# Patient Record
Sex: FEMALE | Race: BLACK OR AFRICAN AMERICAN | NOT HISPANIC OR LATINO | Employment: FULL TIME | URBAN - METROPOLITAN AREA
[De-identification: names, ages, dates, MRNs, and addresses within clinical notes are randomized per-mention and may not be internally consistent; named-entity substitution may affect disease eponyms.]

---

## 2018-05-02 ENCOUNTER — OFFICE VISIT (OUTPATIENT)
Dept: FAMILY MEDICINE CLINIC | Facility: CLINIC | Age: 38
End: 2018-05-02

## 2018-05-02 VITALS
HEART RATE: 86 BPM | DIASTOLIC BLOOD PRESSURE: 88 MMHG | BODY MASS INDEX: 39.25 KG/M2 | WEIGHT: 265 LBS | OXYGEN SATURATION: 100 % | SYSTOLIC BLOOD PRESSURE: 140 MMHG | HEIGHT: 69 IN

## 2018-05-02 DIAGNOSIS — IMO0001 OBESITY, CLASS II, BMI 35-39.9, WITH COMORBIDITY: Primary | ICD-10-CM

## 2018-05-02 PROCEDURE — 99213 OFFICE O/P EST LOW 20 MIN: CPT | Performed by: PHYSICIAN ASSISTANT

## 2018-05-02 NOTE — PROGRESS NOTES
Subjective   Malina Wayne is a 38 y.o. female  Weight Gain (Would like to start on weight loss program)      History of Present Illness  Patient's 38-year-old black female comes in complaining of weight gain patient's currently on prednisone daily due to rhabdomyolysis.  Patient states that because of that she has gained a lot of weight she's been trying to diet and exercise she's tried phentermine in the past like to do it again  Exercising daily watching carb intake.      The following portions of the patient's history were reviewed and updated as appropriate: allergies, current medications, past social history and problem list    Review of Systems   Constitutional: Negative for appetite change, diaphoresis, fatigue and unexpected weight change.   Eyes: Negative for visual disturbance.   Respiratory: Negative for cough, chest tightness and shortness of breath.    Cardiovascular: Negative for chest pain, palpitations and leg swelling.   Gastrointestinal: Negative for diarrhea, nausea and vomiting.   Endocrine: Negative for polydipsia, polyphagia and polyuria.   Skin: Negative for color change and rash.   Neurological: Negative for dizziness, syncope, weakness, light-headedness, numbness and headaches.       Objective     Vitals:    05/02/18 1420   BP: 140/88   Pulse: 86   SpO2: 100%       Physical Exam   Constitutional: She appears well-developed and well-nourished.   Neck: Neck supple. No JVD present. No thyromegaly present.   Cardiovascular: Normal rate, regular rhythm, normal heart sounds, intact distal pulses and normal pulses.    No murmur heard.  Pulmonary/Chest: Effort normal and breath sounds normal. No respiratory distress.   Abdominal: Soft. Bowel sounds are normal. There is no hepatosplenomegaly. There is no tenderness.   Musculoskeletal: She exhibits no edema.   Lymphadenopathy:     She has no cervical adenopathy.   Neurological: No sensory deficit.   Skin: Skin is warm and dry. She is not diaphoretic.    Nursing note and vitals reviewed.      Assessment/Plan     Diagnoses and all orders for this visit:    Obesity, Class II, BMI 35-39.9, with comorbidity    Other orders  -     Mycophenolate Mofetil (CELLCEPT PO); Take  by mouth.     phentermine 37.5 mg 1 by mouth everyday dispense 30×3 refills    Follow-up as needed

## 2018-06-08 ENCOUNTER — OFFICE VISIT (OUTPATIENT)
Dept: FAMILY MEDICINE CLINIC | Facility: CLINIC | Age: 38
End: 2018-06-08

## 2018-06-08 VITALS
HEART RATE: 95 BPM | RESPIRATION RATE: 16 BRPM | SYSTOLIC BLOOD PRESSURE: 128 MMHG | OXYGEN SATURATION: 97 % | HEIGHT: 69 IN | BODY MASS INDEX: 38.66 KG/M2 | DIASTOLIC BLOOD PRESSURE: 86 MMHG | WEIGHT: 261 LBS

## 2018-06-08 DIAGNOSIS — E66.9 OBESITY (BMI 35.0-39.9 WITHOUT COMORBIDITY): Primary | ICD-10-CM

## 2018-06-08 PROCEDURE — 99213 OFFICE O/P EST LOW 20 MIN: CPT | Performed by: PHYSICIAN ASSISTANT

## 2018-06-08 RX ORDER — PHENTERMINE HYDROCHLORIDE 37.5 MG/1
37.5 TABLET ORAL
COMMUNITY
End: 2019-05-20 | Stop reason: SDUPTHER

## 2018-06-08 NOTE — PROGRESS NOTES
Subjective   Malina Wayne is a 38 y.o. female  Obesity (Started phentermine one month ago-hast lost 4#)      History of Present Illness  Patient is a pleasant 38-year-old white female comes in complaining of severe weight gain obesity, she was started on phentermine last visit she also pounds she's trying to walk more be more healthy states she does feel better she has weight loss goal 30 pounds by the end of the summer.  Denies any shortness breath chest pain have been walking as much she can along with low-fat low calorie diet      The following portions of the patient's history were reviewed and updated as appropriate: allergies, current medications, past social history and problem list    Review of Systems   Constitutional: Negative for appetite change, diaphoresis, fatigue and unexpected weight change.   Eyes: Negative for visual disturbance.   Respiratory: Negative for cough, chest tightness and shortness of breath.    Cardiovascular: Negative for chest pain, palpitations and leg swelling.   Gastrointestinal: Negative for diarrhea, nausea and vomiting.   Endocrine: Negative for polydipsia, polyphagia and polyuria.   Skin: Negative for color change and rash.   Neurological: Negative for dizziness, syncope, weakness, light-headedness, numbness and headaches.       Objective     Vitals:    06/08/18 0954   BP: 128/86   Pulse: 95   Resp: 16   SpO2: 97%       Physical Exam   Constitutional: She appears well-developed and well-nourished.   Neck: Neck supple. No JVD present. No thyromegaly present.   Cardiovascular: Normal rate, regular rhythm, normal heart sounds, intact distal pulses and normal pulses.    No murmur heard.  Pulmonary/Chest: Effort normal and breath sounds normal. No respiratory distress.   Abdominal: Soft. Bowel sounds are normal. There is no hepatosplenomegaly. There is no tenderness.   Musculoskeletal: She exhibits no edema.   Lymphadenopathy:     She has no cervical adenopathy.   Neurological: No  sensory deficit.   Skin: Skin is warm and dry. She is not diaphoretic.   Nursing note and vitals reviewed.      Assessment/Plan     Diagnoses and all orders for this visit:    Obesity (BMI 35.0-39.9 without comorbidity)  -     phentermine (ADIPEX-P) 37.5 MG tablet; Take 37.5 mg by mouth Every Morning Before Breakfast.    Discussed with patient diet and exercise time spent with patient 11:30 11:45 AM, 9 minutes that time he's discussed treatment plan long-term treatment plan were discussed low-calorie low-carb diet along with exercise 3-5 times per week phentermine was refilled 37.5 mg everyday ×3 refills

## 2018-08-23 ENCOUNTER — OFFICE VISIT (OUTPATIENT)
Dept: FAMILY MEDICINE CLINIC | Facility: CLINIC | Age: 38
End: 2018-08-23

## 2018-08-23 VITALS
RESPIRATION RATE: 16 BRPM | HEIGHT: 69 IN | DIASTOLIC BLOOD PRESSURE: 82 MMHG | HEART RATE: 97 BPM | BODY MASS INDEX: 39.58 KG/M2 | WEIGHT: 267.2 LBS | SYSTOLIC BLOOD PRESSURE: 118 MMHG | OXYGEN SATURATION: 98 %

## 2018-08-23 DIAGNOSIS — R63.5 WEIGHT GAIN: Primary | ICD-10-CM

## 2018-08-23 PROCEDURE — 99213 OFFICE O/P EST LOW 20 MIN: CPT | Performed by: PHYSICIAN ASSISTANT

## 2018-08-23 NOTE — PROGRESS NOTES
Subjective   Malina Wayne is a 38 y.o. female  Obesity      History of Present Illness  38-year-old black female comes in plan of weight gain she states she's been having follow low-fat diet with some exercise which is helping but she's gained weight since her last visit she's been on phentermine months like refill she has been trying to follow low carb low calorie diet.  The following portions of the patient's history were reviewed and updated as appropriate: allergies, current medications, past social history and problem list    Review of Systems   Constitutional: Negative for appetite change, diaphoresis, fatigue and unexpected weight change.   Eyes: Negative for visual disturbance.   Respiratory: Negative for cough, chest tightness and shortness of breath.    Cardiovascular: Negative for chest pain, palpitations and leg swelling.   Gastrointestinal: Negative for diarrhea, nausea and vomiting.   Endocrine: Negative for polydipsia, polyphagia and polyuria.   Skin: Negative for color change and rash.   Neurological: Negative for dizziness, syncope, weakness, light-headedness, numbness and headaches.       Objective     Vitals:    08/23/18 1001   BP: 118/82   Pulse: 97   Resp: 16   SpO2: 98%       Physical Exam   Constitutional: She appears well-developed and well-nourished.   Neck: Neck supple. No JVD present. No thyromegaly present.   Cardiovascular: Normal rate, regular rhythm, normal heart sounds, intact distal pulses and normal pulses.    No murmur heard.  Pulmonary/Chest: Effort normal and breath sounds normal. No respiratory distress.   Abdominal: Soft. Bowel sounds are normal. There is no hepatosplenomegaly. There is no tenderness.   Musculoskeletal: She exhibits no edema.   Lymphadenopathy:     She has no cervical adenopathy.   Neurological: No sensory deficit.   Skin: Skin is warm and dry. She is not diaphoretic.   Nursing note and vitals reviewed.      Assessment/Plan     Diagnoses and all orders for  this visit:    Weight gain    #1 phentermine 37.5 mg 1 pill everyday dispense 30×3 refills    Follow-up as needed    Times with patient 4:00 to 4:15 AM, 9 minutes that time he's discussed treating plan long-term treatment plan discussed ways to improve overall health talked about low calorie low-carb diet exercise 3-5 times per week for 30 minutes no SI/HI

## 2019-05-20 DIAGNOSIS — E66.9 OBESITY (BMI 35.0-39.9 WITHOUT COMORBIDITY): ICD-10-CM

## 2019-05-20 RX ORDER — PHENTERMINE HYDROCHLORIDE 37.5 MG/1
37.5 TABLET ORAL
Qty: 30 TABLET | Refills: 3 | OUTPATIENT
Start: 2019-05-20 | End: 2020-07-28

## 2019-05-20 NOTE — TELEPHONE ENCOUNTER
----- Message from Rita Black sent at 2019  9:43 AM EDT -----  Contact: [facility]/HUAN JAQUEZ, YAMEL, [phone]  PT. SEE'S SAM.  PT. HAS AN  SCRIPT BROUGHT INTO [facility].  PT. IS WONDERING IF ANOTHER SCRIPT CAN BE WRITTEN FOR PHENTERMINE, 37.5 MG., TAKEN 1/DAY.  (WRITTEN IN AUGUST).  3 REFILLS WERE ON IT.    YAMEL CAN BE REACHED @ [facility].

## 2019-09-25 ENCOUNTER — OFFICE VISIT (OUTPATIENT)
Dept: FAMILY MEDICINE CLINIC | Facility: CLINIC | Age: 39
End: 2019-09-25

## 2019-09-25 VITALS
DIASTOLIC BLOOD PRESSURE: 74 MMHG | OXYGEN SATURATION: 99 % | BODY MASS INDEX: 33.44 KG/M2 | RESPIRATION RATE: 14 BRPM | WEIGHT: 225.8 LBS | HEART RATE: 87 BPM | SYSTOLIC BLOOD PRESSURE: 124 MMHG | HEIGHT: 69 IN

## 2019-09-25 DIAGNOSIS — M54.2 NECK PAIN: ICD-10-CM

## 2019-09-25 DIAGNOSIS — N62 LARGE BREASTS: ICD-10-CM

## 2019-09-25 DIAGNOSIS — R21 RASH: Primary | ICD-10-CM

## 2019-09-25 PROCEDURE — 99214 OFFICE O/P EST MOD 30 MIN: CPT | Performed by: PHYSICIAN ASSISTANT

## 2019-09-25 NOTE — PROGRESS NOTES
Subjective   Malina Wayne is a 39 y.o. female  Neck Pain and Rash (Dry areas on neck/face, itchy)  44 DG    History of Present Illness    Patient is a pleasant 39-year-old white female who comes in complaint of rash around neck for the last 2 to 3 days itchy irritated rash no over-the-counter products used on neck she has not put any hydrocortisone cream or any kind of medication on rash,    Patient complains of chronic neck pain and shoulder area back she was been diagnosed with polymyositis in the past she has chronic weakness in her upper extremity due to her large pendulous breast patient worse 44 Dg, patient has pain in both right left shoulders, weakness sometimes in upper extremities.  The following portions of the patient's history were reviewed and updated as appropriate: allergies, current medications, past social history and problem list    Review of Systems   Constitutional: Negative for fatigue and unexpected weight change.   Respiratory: Negative for cough, chest tightness and shortness of breath.    Cardiovascular: Negative for chest pain, palpitations and leg swelling.   Gastrointestinal: Negative for nausea.   Musculoskeletal: Positive for back pain and neck pain.   Skin: Positive for rash. Negative for color change.   Neurological: Negative for dizziness, syncope, weakness and headaches.       Objective     Vitals:    09/25/19 1611   BP: 124/74   Pulse: 87   Resp: 14   SpO2: 99%       Physical Exam   Constitutional: She appears well-developed and well-nourished.   Neck: No JVD present.   Cardiovascular: Normal rate, regular rhythm, normal heart sounds, intact distal pulses and normal pulses.   No murmur heard.  Pulmonary/Chest: Effort normal and breath sounds normal. No respiratory distress.       Abdominal: Soft. Bowel sounds are normal. There is no hepatosplenomegaly. There is no tenderness.   Musculoskeletal: She exhibits no edema.        Cervical back: She exhibits decreased range of motion,  tenderness and bony tenderness.   Skin: Skin is warm and dry.        Nursing note and vitals reviewed.      Assessment/Plan     Diagnoses and all orders for this visit:    Rash    Neck pain    Large breasts  -     Ambulatory Referral to Plastic Surgery    #1 eucuisa cream, apply twice daily dispense 30 g samples given    2.  Range of motion exercises given for neck, OTC Tylenol for pain    3.  Refer to plastic surgery for evaluation of breast reduction

## 2019-10-25 ENCOUNTER — TELEPHONE (OUTPATIENT)
Dept: FAMILY MEDICINE CLINIC | Facility: CLINIC | Age: 39
End: 2019-10-25

## 2019-10-25 NOTE — TELEPHONE ENCOUNTER
Pt would like a call to discuss her:  AMB REFERRAL TO PLASTIC SURGERY    Crystal can be reached at [phone].

## 2019-10-28 NOTE — TELEPHONE ENCOUNTER
Patient hasn't heard about appointment yet and I told her that you would be able to help her more than me. Thank you!

## 2020-01-03 ENCOUNTER — TELEPHONE (OUTPATIENT)
Dept: FAMILY MEDICINE CLINIC | Facility: CLINIC | Age: 40
End: 2020-01-03

## 2020-01-03 NOTE — TELEPHONE ENCOUNTER
Divine from [facility] is calling to inform Bernard Marie that she is giving the patient Midol, Probiotic Caps, and Sleep 8 over the counter.    For any questions or concerns please call Divine at .

## 2020-02-10 ENCOUNTER — TELEPHONE (OUTPATIENT)
Dept: FAMILY MEDICINE CLINIC | Facility: CLINIC | Age: 40
End: 2020-02-10

## 2020-02-10 NOTE — TELEPHONE ENCOUNTER
Patient requesting a call back regarding the status of the referral for Breast Reduction Surgery.   Patient’s call back number is: [phone]

## 2020-07-28 ENCOUNTER — OFFICE VISIT (OUTPATIENT)
Dept: INTERNAL MEDICINE | Facility: CLINIC | Age: 40
End: 2020-07-28

## 2020-07-28 VITALS
SYSTOLIC BLOOD PRESSURE: 122 MMHG | OXYGEN SATURATION: 98 % | RESPIRATION RATE: 16 BRPM | WEIGHT: 164 LBS | BODY MASS INDEX: 24.29 KG/M2 | HEIGHT: 69 IN | TEMPERATURE: 98.1 F | DIASTOLIC BLOOD PRESSURE: 78 MMHG | HEART RATE: 76 BPM

## 2020-07-28 DIAGNOSIS — T50.905A DRUG INDUCED RASH WITH EOSINOPHILIA AND SYSTEMIC SYMPTOMS: Primary | ICD-10-CM

## 2020-07-28 DIAGNOSIS — M33.20 POLYMYOSITIS (HCC): ICD-10-CM

## 2020-07-28 DIAGNOSIS — D72.12 DRUG INDUCED RASH WITH EOSINOPHILIA AND SYSTEMIC SYMPTOMS: Primary | ICD-10-CM

## 2020-07-28 PROCEDURE — 99213 OFFICE O/P EST LOW 20 MIN: CPT | Performed by: NURSE PRACTITIONER

## 2020-07-28 RX ORDER — TRIAMCINOLONE ACETONIDE 5 MG/G
OINTMENT TOPICAL 2 TIMES DAILY
Qty: 15 G | Refills: 1 | Status: SHIPPED | OUTPATIENT
Start: 2020-07-28

## 2020-07-28 RX ORDER — HYDROXYZINE HYDROCHLORIDE 25 MG/1
25 TABLET, FILM COATED ORAL EVERY 6 HOURS PRN
Qty: 30 TABLET | Refills: 5 | Status: SHIPPED | OUTPATIENT
Start: 2020-07-28 | End: 2021-07-27 | Stop reason: SDUPTHER

## 2020-07-28 RX ORDER — TACROLIMUS 1 MG/1
3 CAPSULE ORAL
COMMUNITY
End: 2020-11-10

## 2020-07-28 RX ORDER — METHYLPREDNISOLONE 4 MG/1
TABLET ORAL
Qty: 21 TABLET | Refills: 0 | Status: SHIPPED | OUTPATIENT
Start: 2020-07-28 | End: 2020-11-10

## 2020-07-28 NOTE — PATIENT INSTRUCTIONS
Sertraline tablets  What is this medicine?  SERTRALINE (SER tra zhane) is used to treat depression. It may also be used to treat obsessive compulsive disorder, panic disorder, post-trauma stress, premenstrual dysphoric disorder (PMDD) or social anxiety.  This medicine may be used for other purposes; ask your health care provider or pharmacist if you have questions.  COMMON BRAND NAME(S): Zoloft  What should I tell my health care provider before I take this medicine?  They need to know if you have any of these conditions:  · bleeding disorders  · bipolar disorder or a family history of bipolar disorder  · glaucoma  · heart disease  · high blood pressure  · history of irregular heartbeat  · history of low levels of calcium, magnesium, or potassium in the blood  · if you often drink alcohol  · liver disease  · receiving electroconvulsive therapy  · seizures  · suicidal thoughts, plans, or attempt; a previous suicide attempt by you or a family member  · take medicines that treat or prevent blood clots  · thyroid disease  · an unusual or allergic reaction to sertraline, other medicines, foods, dyes, or preservatives  · pregnant or trying to get pregnant  · breast-feeding  How should I use this medicine?  Take this medicine by mouth with a glass of water. Follow the directions on the prescription label. You can take it with or without food. Take your medicine at regular intervals. Do not take your medicine more often than directed. Do not stop taking this medicine suddenly except upon the advice of your doctor. Stopping this medicine too quickly may cause serious side effects or your condition may worsen.  A special MedGuide will be given to you by the pharmacist with each prescription and refill. Be sure to read this information carefully each time.  Talk to your pediatrician regarding the use of this medicine in children. While this drug may be prescribed for children as young as 7 years for selected conditions,  precautions do apply.  Overdosage: If you think you have taken too much of this medicine contact a poison control center or emergency room at once.  NOTE: This medicine is only for you. Do not share this medicine with others.  What if I miss a dose?  If you miss a dose, take it as soon as you can. If it is almost time for your next dose, take only that dose. Do not take double or extra doses.  What may interact with this medicine?  Do not take this medicine with any of the following medications:  · cisapride  · dronedarone  · linezolid  · MAOIs like Carbex, Eldepryl, Marplan, Nardil, and Parnate  · methylene blue (injected into a vein)  · pimozide  · thioridazine  This medicine may also interact with the following medications:  · alcohol  · amphetamines  · aspirin and aspirin-like medicines  · certain medicines for depression, anxiety, or psychotic disturbances  · certain medicines for fungal infections like ketoconazole, fluconazole, posaconazole, and itraconazole  · certain medicines for irregular heart beat like flecainide, quinidine, propafenone  · certain medicines for migraine headaches like almotriptan, eletriptan, frovatriptan, naratriptan, rizatriptan, sumatriptan, zolmitriptan  · certain medicines for sleep  · certain medicines for seizures like carbamazepine, valproic acid, phenytoin  · certain medicines that treat or prevent blood clots like warfarin, enoxaparin, dalteparin  · cimetidine  · digoxin  · diuretics  · fentanyl  · isoniazid  · lithium  · NSAIDs, medicines for pain and inflammation, like ibuprofen or naproxen  · other medicines that prolong the QT interval (cause an abnormal heart rhythm) like dofetilide  · rasagiline  · safinamide  · supplements like [place]'s wort, kava kava, valerian  · tolbutamide  · tramadol  · tryptophan  This list may not describe all possible interactions. Give your health care provider a list of all the medicines, herbs, non-prescription drugs, or dietary supplements  you use. Also tell them if you smoke, drink alcohol, or use illegal drugs. Some items may interact with your medicine.  What should I watch for while using this medicine?  Tell your doctor if your symptoms do not get better or if they get worse. Visit your doctor or health care professional for regular checks on your progress. Because it may take several weeks to see the full effects of this medicine, it is important to continue your treatment as prescribed by your doctor.  Patients and their families should watch out for new or worsening thoughts of suicide or depression. Also watch out for sudden changes in feelings such as feeling anxious, agitated, panicky, irritable, hostile, aggressive, impulsive, severely restless, overly excited and hyperactive, or not being able to sleep. If this happens, especially at the beginning of treatment or after a change in dose, call your health care professional.  You may get drowsy or dizzy. Do not drive, use machinery, or do anything that needs mental alertness until you know how this medicine affects you. Do not stand or sit up quickly, especially if you are an older patient. This reduces the risk of dizzy or fainting spells. Alcohol may interfere with the effect of this medicine. Avoid alcoholic drinks.  Your mouth may get dry. Chewing sugarless gum or sucking hard candy, and drinking plenty of water may help. Contact your doctor if the problem does not go away or is severe.  What side effects may I notice from receiving this medicine?  Side effects that you should report to your doctor or health care professional as soon as possible:  · allergic reactions like skin rash, itching or hives, swelling of the face, lips, or tongue  · anxious  · black, tarry stools  · changes in vision  · confusion  · elevated mood, decreased need for sleep, racing thoughts, impulsive behavior  · eye pain  · fast, irregular heartbeat  · feeling faint or lightheaded, falls  · feeling agitated,  angry, or irritable  · hallucination, loss of contact with reality  · loss of balance or coordination  · loss of memory  · painful or prolonged erections  · restlessness, pacing, inability to keep still  · seizures  · stiff muscles  · suicidal thoughts or other mood changes  · trouble sleeping  · unusual bleeding or bruising  · unusually weak or tired  · vomiting  Side effects that usually do not require medical attention (report to your doctor or health care professional if they continue or are bothersome):  · change in appetite or weight  · change in sex drive or performance  · diarrhea  · increased sweating  · indigestion, nausea  · tremors  This list may not describe all possible side effects. Call your doctor for medical advice about side effects. You may report side effects to FDA at 2-738-YKM-6042.  Where should I keep my medicine?  Keep out of the reach of children.  Store at room temperature between 15 and 30 degrees C (59 and 86 degrees F). Throw away any unused medicine after the expiration date.  NOTE: This sheet is a summary. It may not cover all possible information. If you have questions about this medicine, talk to your doctor, pharmacist, or health care provider.  © 2020 [employer] (2019-12-10 10:09:27)

## 2020-07-28 NOTE — PROGRESS NOTES
Malina Wayne is a 40 y.o. female who presents today to establish care and discuss possible medication reaction.    Chief Complaint   Patient presents with   • Establish Care     Pt states itchiness and dry   • Medication Problem       Allergic Reaction   This is a recurrent problem. The current episode started more than 1 week ago. The problem occurs intermittently. The problem has been waxing and waning since onset. The problem is mild. The patient was exposed to a prescription drug and unknown. The time of exposure is not relevant (no exposure). Associated symptoms include itching and a rash. Pertinent negatives include no abdominal pain, chest pain or coughing. There is no swelling present. Past treatments include rest and diphenhydramine. The treatment provided mild relief. There is no history of medication allergies or seasonal allergies.       The following portions of the patient's history were reviewed and updated as appropriate: allergies, current medications, past family history, past medical history, past social history, past surgical history and problem list.     Past Medical History:   Diagnosis Date   • Acute pharyngitis    • Conjunctivitis    • Dysthymic disorder    • Hypertension    • Malaise and fatigue    • Nonspecific abnormal serum enzyme levels    • Serous otitis media    • Weight gain        History reviewed. No pertinent surgical history.    Family History   Problem Relation Age of Onset   • Hypertension Mother    • Hyperlipidemia Mother    • Heart failure Father    • No Known Problems Sister    • Hypertension Brother    • Stroke Maternal Grandmother        Social History     Socioeconomic History   • Marital status:      Spouse name: Not on file   • Number of children: Not on file   • Years of education: Not on file   • Highest education level: Not on file   Tobacco Use   • Smoking status: Never Smoker   • Smokeless tobacco: Never Used   Substance and Sexual Activity   • Alcohol use:  Yes     Comment: Occ   • Drug use: No   • Sexual activity: Defer       No Known Allergies      Current Outpatient Medications:   •  Calcium Carbonate (CALCIUM 600 PO), Take  by mouth., Disp: , Rfl:   •  chlorthalidone (HYGROTEN) 25 MG tablet, , Disp: , Rfl:   •  Cholecalciferol (VITAMIN D PO), Take  by mouth., Disp: , Rfl:   •  folic acid (FOLVITE) 1 MG tablet, , Disp: , Rfl:   •  Mycophenolate Mofetil (CELLCEPT PO), Take 1,000 mg by mouth., Disp: , Rfl:   •  potassium chloride (KLOR-CON) 20 MEQ CR tablet, Take 20 mEq by mouth 2 (two) times a day., Disp: , Rfl:   •  riTUXimab (RITUXAN) 10 MG/ML solution injection, Infuse 10 mg into a venous catheter Every 6 (Six) Months., Disp: , Rfl:   •  tacrolimus (PROGRAF) 1 MG capsule, 3 mg., Disp: , Rfl:   •  hydrOXYzine (ATARAX) 25 MG tablet, Take 1 tablet by mouth Every 6 (Six) Hours As Needed for Itching or Anxiety., Disp: 30 tablet, Rfl: 5  •  methylPREDNISolone (Medrol) 4 MG tablet, Take as directed on package instructions., Disp: 21 tablet, Rfl: 0  •  triamcinolone (KENALOG) 0.5 % ointment, Apply  topically to the appropriate area as directed 2 (Two) Times a Day., Disp: 15 g, Rfl: 1    Health Maintenance   Topic Date Due   • ANNUAL PHYSICAL  02/20/1982   • TDAP/TD VACCINES (1 - Tdap) 02/20/1990   • HEPATITIS C SCREENING  06/09/2016   • INFLUENZA VACCINE  08/01/2020   • PAP SMEAR  02/21/2023        ROS    Review of Systems   Constitutional: Negative for chills and fever.   Eyes: Negative for blurred vision and visual disturbance.   Respiratory: Negative for cough.    Cardiovascular: Negative for chest pain, palpitations and leg swelling.   Gastrointestinal: Negative for abdominal pain and nausea.   Skin: Positive for color change, itching and rash.   Allergic/Immunologic: Negative for immunocompromised state.   Neurological: Negative for headache.   Hematological: Negative for adenopathy.       Visit Vitals  /78   Pulse 76   Temp 98.1 °F (36.7 °C)   Resp 16   Ht  "175.3 cm (69\")   Wt 74.4 kg (164 lb)   SpO2 98%   Breastfeeding No   BMI 24.22 kg/m²       Physical Exam     Physical Exam   Constitutional: She is oriented to person, place, and time. She appears well-developed and well-nourished. No distress.   HENT:   Head: Normocephalic and atraumatic.   Eyes: Pupils are equal, round, and reactive to light. Conjunctivae and EOM are normal.   Neck: Normal range of motion. Neck supple.   Cardiovascular: Normal rate, regular rhythm and normal heart sounds. Exam reveals no gallop and no friction rub.   No murmur heard.  Pulmonary/Chest: Effort normal and breath sounds normal.   Musculoskeletal: Normal range of motion.   Lymphadenopathy:     She has no cervical adenopathy.   Neurological: She is alert and oriented to person, place, and time.   Skin: Skin is warm. Capillary refill takes less than 2 seconds. Rash noted. She is not diaphoretic.   Hypopigmentation in groin area and some spots on arms.   Psychiatric: She has a normal mood and affect. Her behavior is normal. Judgment and thought content normal.   Nursing note and vitals reviewed.      Assessment/Plan   Problems Addressed this Visit        Musculoskeletal and Integument    Polymyositis (CMS/HCC)     Continue current medication regimen. Patient requested second opinion with another rheumatologist.          Relevant Orders    Ambulatory Referral to Rheumatology (Completed)      Other Visit Diagnoses     Drug induced rash with eosinophilia and systemic symptoms    -  Primary    Take medications as prescribed. Referrals placed.    Relevant Medications    methylPREDNISolone (Medrol) 4 MG tablet    hydrOXYzine (ATARAX) 25 MG tablet    triamcinolone (KENALOG) 0.5 % ointment    Other Relevant Orders    Ambulatory Referral to Dermatology (Completed)              Plan of care reviewed with patient at the conclusion of today's visit. Education was provided regarding diagnosis, management and any prescribed or recommended OTC " medications.  Patient verbalizes understanding of and agreement with management plan.    Return in about 3 months (around 10/28/2020) for Recheck.    ISMAEL Casillas

## 2020-07-29 NOTE — ASSESSMENT & PLAN NOTE
Continue current medication regimen. Patient requested second opinion with another rheumatologist.

## 2020-08-19 ENCOUNTER — TELEPHONE (OUTPATIENT)
Dept: INTERNAL MEDICINE | Facility: CLINIC | Age: 40
End: 2020-08-19

## 2020-08-19 NOTE — TELEPHONE ENCOUNTER
PT CALLED TO GET A REFERRAL FOR A BREAST REDUCTION. PT STATED THAT A REFERRAL WAS ALREADY SENT BUT SHE HAS A NEW PCP AND МАРИЯ OCAMPO NEEDS RESEND THE REFERRAL.    PT CONTACT [phone]     PLEASE ADVISE

## 2020-08-21 DIAGNOSIS — N62 LARGE BREASTS: Primary | ICD-10-CM

## 2020-11-10 ENCOUNTER — OFFICE VISIT (OUTPATIENT)
Dept: INTERNAL MEDICINE | Facility: CLINIC | Age: 40
End: 2020-11-10

## 2020-11-10 VITALS
OXYGEN SATURATION: 99 % | BODY MASS INDEX: 25.18 KG/M2 | WEIGHT: 170 LBS | SYSTOLIC BLOOD PRESSURE: 128 MMHG | HEART RATE: 82 BPM | TEMPERATURE: 97.3 F | HEIGHT: 69 IN | DIASTOLIC BLOOD PRESSURE: 90 MMHG

## 2020-11-10 DIAGNOSIS — F41.1 GENERALIZED ANXIETY DISORDER: Primary | ICD-10-CM

## 2020-11-10 DIAGNOSIS — L30.9 ECZEMA, UNSPECIFIED TYPE: ICD-10-CM

## 2020-11-10 PROCEDURE — 99214 OFFICE O/P EST MOD 30 MIN: CPT | Performed by: NURSE PRACTITIONER

## 2020-11-10 RX ORDER — DUPILUMAB 300 MG/2ML
INJECTION, SOLUTION SUBCUTANEOUS
COMMUNITY
Start: 2020-11-05

## 2020-11-10 RX ORDER — BUSPIRONE HYDROCHLORIDE 7.5 MG/1
7.5 TABLET ORAL 3 TIMES DAILY
Qty: 90 TABLET | Refills: 1 | Status: SHIPPED | OUTPATIENT
Start: 2020-11-10

## 2020-11-10 NOTE — PROGRESS NOTES
Chief Complaint   Patient presents with   • Rash     Follow up   • Anxiety       History of Present Illness    Malina Wayne is a 40 y.o. female who presents today for rash and anxiety.    Derm- has been following with dermatology per previous referral for eczema, was prescribed Dupixent and triamcinolone cream which has worked well.  Rash has resolved and patient denies open skin wounds or lesions at this time. Areas generally affected are across chest and bra line, lower back.has also tried to eliminate sugar and carbohydrates from diet to help with skin conditions.    Anxiety- anxiety worse at work; working third shift as a .  Reports persistent insomnia with poor sleep quality secondary to broken sleeping pattern throughout the day while trying to care for young children.  Sleeps about 3 hours at a time. Also having multiple external stressors with marital concerns. Taking hydroxyzine as needed but no difference noticed, tried taking every 5-6 hours, does not notice a relief with scheduled dosing either.  She reports no adverse side effects of hydroxyzine, no next-day drowsiness or fatigue noted. Has had one panic episode while at work in the last few months. Has supportive family/friends. Seeing a therapist biweekly to monthly.  Endorses frequent racing thoughts, insomnia, crying.  Feels like happiness is limited due to history of adverse events when things have gone well.    DARLENE-7: 16     Review of Systems  Review of Systems   Constitutional: Negative for appetite change, chills, diaphoresis, fatigue and fever.   Respiratory: Negative for cough and shortness of breath.    Cardiovascular: Negative for chest pain and palpitations.   Gastrointestinal: Negative for abdominal pain, diarrhea, nausea and vomiting.   Skin: Negative for color change, dry skin and rash.   Neurological: Negative for dizziness, weakness, light-headedness and headache.   Psychiatric/Behavioral: Positive for sleep disturbance  and stress. Negative for behavioral problems, decreased concentration, self-injury, suicidal ideas and depressed mood. The patient is nervous/anxious.        [facility]    The following portions of the patient's history were reviewed and updated as appropriate: allergies, current medications, past family history, past medical history, past social history, past surgical history and problem list.     Social History     Tobacco Use   • Smoking status: Never Smoker   • Smokeless tobacco: Never Used   Substance Use Topics   • Alcohol use: Yes     Comment: Occ       Past Medical History:   Diagnosis Date   • Acute pharyngitis    • Conjunctivitis    • Dysthymic disorder    • Hypertension    • Malaise and fatigue    • Nonspecific abnormal serum enzyme levels    • Serous otitis media    • Weight gain        History reviewed. No pertinent surgical history.    Family History   Problem Relation Age of Onset   • Hypertension Mother    • Hyperlipidemia Mother    • Heart failure Father    • No Known Problems Sister    • Hypertension Brother    • Stroke Maternal Grandmother        No Known Allergies      Current Outpatient Medications:   •  Calcium Carbonate (CALCIUM 600 PO), Take  by mouth., Disp: , Rfl:   •  Cholecalciferol (VITAMIN D PO), Take  by mouth., Disp: , Rfl:   •  Dupixent 300 MG/2ML solution prefilled syringe, , Disp: , Rfl:   •  folic acid (FOLVITE) 1 MG tablet, , Disp: , Rfl:   •  hydrOXYzine (ATARAX) 25 MG tablet, Take 1 tablet by mouth Every 6 (Six) Hours As Needed for Itching or Anxiety., Disp: 30 tablet, Rfl: 5  •  potassium chloride (KLOR-CON) 20 MEQ CR tablet, Take 20 mEq by mouth 2 (two) times a day., Disp: , Rfl:   •  riTUXimab (RITUXAN) 10 MG/ML solution injection, Infuse 10 mg into a venous catheter Every 6 (Six) Months., Disp: , Rfl:   •  triamcinolone (KENALOG) 0.5 % ointment, Apply  topically to the appropriate area as directed 2 (Two) Times a Day., Disp: 15 g, Rfl: 1  •  busPIRone (BUSPAR) 7.5 MG tablet,  "Take 1 tablet by mouth 3 (Three) Times a Day., Disp: 90 tablet, Rfl: 1  •  chlorthalidone (HYGROTEN) 25 MG tablet, , Disp: , Rfl:     Vitals:  Vitals:    11/10/20 1000   BP: 128/90   Pulse: 82   Temp: 97.3 °F (36.3 °C)   SpO2: 99%   Weight: 77.1 kg (170 lb)   Height: 175.3 cm (69\")   PainSc: 0-No pain       Physical Exam  Physical Exam  Vitals signs and nursing note reviewed.   Constitutional:       Appearance: Normal appearance. She is well-developed. She is not ill-appearing or toxic-appearing.   Cardiovascular:      Rate and Rhythm: Normal rate and regular rhythm.      Heart sounds: Normal heart sounds.   Pulmonary:      Effort: Pulmonary effort is normal. No respiratory distress.      Breath sounds: Normal breath sounds. No decreased breath sounds, wheezing, rhonchi or rales.   Skin:     General: Skin is warm and dry.      Findings: No rash.   Neurological:      Mental Status: She is alert.      GCS: GCS eye subscore is 4. GCS verbal subscore is 5. GCS motor subscore is 6.   Psychiatric:         Attention and Perception: Attention and perception normal.         Mood and Affect: Mood and affect normal.         Behavior: Behavior normal. Behavior is cooperative.         Thought Content: Thought content normal. Thought content does not include homicidal or suicidal ideation. Thought content does not include homicidal or suicidal plan.         Cognition and Memory: Cognition and memory normal.         Judgment: Judgment normal.         Labs  No results found for this or any previous visit.    Assessment/Plan    Diagnoses and all orders for this visit:    1. Generalized anxiety disorder (Primary)  -     busPIRone (BUSPAR) 7.5 MG tablet; Take 1 tablet by mouth 3 (Three) Times a Day.  Dispense: 90 tablet; Refill: 1  We will start buspirone 7.5 mg twice daily x2 weeks, increase to 3 times daily after 2 weeks if well tolerated.  Advised continued counseling as previously indicated.  Counseled patient regarding multimodal " approach with healthy nutrition, healthy sleep, regular physical activity, social activities, counseling, and medications.  Reviewed medication options and common side effects. Patient advised to seek urgent follow-up in office or emergency medical attention should they develop thoughts of harm to self or others. Patients verbalizes understanding and agreement with plan of care.     2. Eczema, unspecified type  Instructions provided on use of topical steroid cream for acute eczema flares. Advised in use of emollients such as Aveeno, Eucerin, or Aquaphor between steroid use and after eczema has cleared to prevent further outbreaks.  Advised to Keep rash clean and dry and avoid scratching or rubbing skin as this can introduce infection.       Plan of care reviewed with patient at conclusion of today's visit. Patient education was provided regarding diagnosis, management, and prescribed or recommended OTC medications. Patient was informed to notify office of any new, worsening, or persistent symptoms. Patient verbalized understanding and agreement with plan of care.     Follow-Up  Return in about 6 weeks (around 12/22/2020) for F/U with PCP.      Electronically Signed By:  ISMAEL Marin Dragon/Transcription Disclaimer:  Please note that portions of this encounter note were completed using electronic transcription/translation of spoken language to printed text.  The electronic transcription/translation of spoken language may permit erroneous, or at times, nonsensical words or phrases to be inadvertently transcribed.  Although I have reviewed the note for such errors, some may still exist in this documentation.

## 2021-06-13 ENCOUNTER — APPOINTMENT (OUTPATIENT)
Dept: PREADMISSION TESTING | Facility: HOSPITAL | Age: 41
End: 2021-06-13

## 2021-06-14 ENCOUNTER — APPOINTMENT (OUTPATIENT)
Dept: PREADMISSION TESTING | Facility: HOSPITAL | Age: 41
End: 2021-06-14

## 2021-06-14 LAB — SARS-COV-2 RNA PNL SPEC NAA+PROBE: NOT DETECTED

## 2021-06-14 PROCEDURE — U0004 COV-19 TEST NON-CDC HGH THRU: HCPCS

## 2021-06-14 PROCEDURE — C9803 HOPD COVID-19 SPEC COLLECT: HCPCS

## 2021-07-27 DIAGNOSIS — D72.12 DRUG INDUCED RASH WITH EOSINOPHILIA AND SYSTEMIC SYMPTOMS: ICD-10-CM

## 2021-07-27 DIAGNOSIS — T50.905A DRUG INDUCED RASH WITH EOSINOPHILIA AND SYSTEMIC SYMPTOMS: ICD-10-CM

## 2021-08-01 RX ORDER — HYDROXYZINE HYDROCHLORIDE 25 MG/1
25 TABLET, FILM COATED ORAL EVERY 6 HOURS PRN
Qty: 30 TABLET | Refills: 5 | Status: SHIPPED | OUTPATIENT
Start: 2021-08-01

## 2024-02-20 ENCOUNTER — OFFICE VISIT (OUTPATIENT)
Dept: FAMILY MEDICINE CLINIC | Facility: CLINIC | Age: 44
End: 2024-02-20
Payer: COMMERCIAL

## 2024-02-20 VITALS
WEIGHT: 176.4 LBS | TEMPERATURE: 97.6 F | HEIGHT: 69 IN | DIASTOLIC BLOOD PRESSURE: 76 MMHG | RESPIRATION RATE: 14 BRPM | HEART RATE: 65 BPM | OXYGEN SATURATION: 99 % | BODY MASS INDEX: 26.13 KG/M2 | SYSTOLIC BLOOD PRESSURE: 118 MMHG

## 2024-02-20 DIAGNOSIS — R19.05 PERIUMBILICAL MASS: Primary | ICD-10-CM

## 2024-02-20 NOTE — PROGRESS NOTES
Subjective   Malina Wayne is a 44 y.o. female  Knot (Above belly button, some tenderness-noticed a few days ago.)      History of Present Illness  Patient is a pleasant 44-year-old black female who comes in complaining of periumbilical mass noticed a couple days ago patient states is slightly tender movable.  Patient does have previous history of polymyositis  The following portions of the patient's history were reviewed and updated as appropriate: allergies, current medications, past social history and problem list    Review of Systems   Constitutional:  Negative for fatigue and unexpected weight change.   Respiratory:  Negative for cough, chest tightness and shortness of breath.    Cardiovascular:  Negative for chest pain, palpitations and leg swelling.   Gastrointestinal:  Negative for nausea.   Skin:  Negative for color change and rash.   Neurological:  Negative for dizziness, syncope, weakness and headaches.       Objective     Vitals:    02/20/24 1322   BP: 118/76   Pulse: 65   Resp: 14   Temp: 97.6 °F (36.4 °C)   SpO2: 99%       Physical Exam  Vitals and nursing note reviewed.   Constitutional:       General: She is not in acute distress.     Appearance: Normal appearance. She is well-developed. She is not ill-appearing, toxic-appearing or diaphoretic.   Neck:      Vascular: No carotid bruit or JVD.   Cardiovascular:      Rate and Rhythm: Normal rate and regular rhythm.      Pulses: Normal pulses.      Heart sounds: Normal heart sounds. No murmur heard.  Pulmonary:      Effort: Pulmonary effort is normal. No respiratory distress.      Breath sounds: Normal breath sounds.   Abdominal:      Palpations: Abdomen is soft.      Tenderness: There is no abdominal tenderness.       Skin:     General: Skin is warm and dry.   Neurological:      Mental Status: She is alert.         Assessment & Plan     Diagnoses and all orders for this visit:    1. Periumbilical mass (Primary)  -     Ambulatory Referral to General  Surgery     I spent 15 minutes in patient care: Reviewing records prior to the visit, examining the patient, entering orders and documentation    Part of this note may be an electronic transcription/translation of spoken language to printed text using the Dragon Dictation System.

## 2024-11-11 ENCOUNTER — OFFICE VISIT (OUTPATIENT)
Dept: FAMILY MEDICINE CLINIC | Facility: CLINIC | Age: 44
End: 2024-11-11
Payer: COMMERCIAL

## 2024-11-11 VITALS
RESPIRATION RATE: 14 BRPM | TEMPERATURE: 97.6 F | SYSTOLIC BLOOD PRESSURE: 128 MMHG | HEIGHT: 69 IN | HEART RATE: 68 BPM | DIASTOLIC BLOOD PRESSURE: 90 MMHG | OXYGEN SATURATION: 99 % | WEIGHT: 189 LBS | BODY MASS INDEX: 27.99 KG/M2

## 2024-11-11 DIAGNOSIS — Z12.11 SCREEN FOR COLON CANCER: ICD-10-CM

## 2024-11-11 DIAGNOSIS — Z23 FLU VACCINE NEED: ICD-10-CM

## 2024-11-11 DIAGNOSIS — M54.2 NECK PAIN: Primary | ICD-10-CM

## 2024-11-11 DIAGNOSIS — I10 HYPERTENSION, UNSPECIFIED TYPE: ICD-10-CM

## 2024-11-11 PROCEDURE — 90656 IIV3 VACC NO PRSV 0.5 ML IM: CPT | Performed by: PHYSICIAN ASSISTANT

## 2024-11-11 PROCEDURE — 90471 IMMUNIZATION ADMIN: CPT | Performed by: PHYSICIAN ASSISTANT

## 2024-11-11 PROCEDURE — 99214 OFFICE O/P EST MOD 30 MIN: CPT | Performed by: PHYSICIAN ASSISTANT

## 2024-11-11 RX ORDER — OLMESARTAN MEDOXOMIL 5 MG/1
5 TABLET ORAL DAILY
Qty: 90 TABLET | Refills: 3 | Status: SHIPPED | OUTPATIENT
Start: 2024-11-11

## 2024-11-11 RX ORDER — CHLORTHALIDONE 25 MG/1
25 TABLET ORAL DAILY
Qty: 90 TABLET | Refills: 3 | Status: SHIPPED | OUTPATIENT
Start: 2024-11-11

## 2024-11-11 RX ORDER — CYCLOBENZAPRINE HCL 10 MG
10 TABLET ORAL 2 TIMES DAILY PRN
Qty: 30 TABLET | Refills: 1 | Status: SHIPPED | OUTPATIENT
Start: 2024-11-11

## 2024-11-11 RX ORDER — OLMESARTAN MEDOXOMIL 5 MG/1
1 TABLET ORAL DAILY
COMMUNITY
Start: 2024-07-24 | End: 2024-11-11 | Stop reason: SDUPTHER

## 2024-11-11 NOTE — PROGRESS NOTES
Subjective   Malina Wayne is a 44 y.o. female  Neck Pain (Rt side of neck x1 month, pulling sensation and occ numbness and tingling ), Flu Vaccine, and Hypertension (RF olmesartan and chlorthalidone 90-day supplies )      Neck Pain   Pertinent negatives include no chest pain, headaches or weakness.   Hypertension  Associated symptoms include neck pain. Pertinent negatives include no chest pain, headaches, palpitations or shortness of breath.     History of Present Illness  The patient presents for evaluation of neck pain.    She has been experiencing neck pain since the beginning of October 2024, initially attributing it to sleeping in an awkward position. However, the discomfort has persisted. She recalls a visit to [facility] in September 2024, where she rode roller coasters, but does not remember any specific incident that could have caused the pain. Over time, the pain has intensified, and she now experiences tingling sensations extending down her right arm.    She is currently on a regimen of chlorthalidone 5 mg for blood pressure management and reports no issues with this medication.    The following portions of the patient's history were reviewed and updated as appropriate: allergies, current medications, past social history and problem list    Review of Systems   Constitutional:  Negative for fatigue and unexpected weight change.   Respiratory:  Negative for cough, chest tightness and shortness of breath.    Cardiovascular:  Negative for chest pain, palpitations and leg swelling.   Gastrointestinal:  Negative for nausea.   Musculoskeletal:  Positive for neck pain.   Skin:  Negative for color change and rash.   Neurological:  Negative for dizziness, syncope, weakness and headaches.       Objective     Vitals:    11/11/24 1052   BP: 128/90   Pulse: 68   Resp: 14   Temp: 97.6 °F (36.4 °C)   SpO2: 99%       Physical Exam  Vitals and nursing note reviewed.   Constitutional:       General: She is not in acute  distress.     Appearance: Normal appearance. She is well-developed. She is not ill-appearing, toxic-appearing or diaphoretic.   Neck:      Vascular: No carotid bruit or JVD.   Cardiovascular:      Rate and Rhythm: Normal rate and regular rhythm.      Pulses: Normal pulses.      Heart sounds: Normal heart sounds. No murmur heard.  Pulmonary:      Effort: Pulmonary effort is normal. No respiratory distress.      Breath sounds: Normal breath sounds.   Abdominal:      Palpations: Abdomen is soft.      Tenderness: There is no abdominal tenderness.   Skin:     General: Skin is warm and dry.   Neurological:      Mental Status: She is alert.       Physical Exam      Assessment & Plan   Assessment & Plan  1. Torticollis.  The patient's symptoms, including neck pain and tingling down the right arm, are indicative of torticollis. She reports the pain started in early October and has been persistent. A muscle relaxer will be prescribed. She will be given a set of home exercises to improve her neck's range of motion.    2. Hypertension.  Her blood pressure readings are within the normal range. She is currently taking chlorthalidone and Benicar. She is advised to discontinue Benicar and monitor her blood pressure closely. If her blood pressure begins to rise, she should resume taking Benicar. The prescription for Benicar has been refilled.    3. Health Maintenance.  A colonoscopy is recommended for routine health maintenance as she is 44 years old.      Diagnoses and all orders for this visit:    1. Neck pain (Primary)  -     cyclobenzaprine (FLEXERIL) 10 MG tablet; Take 1 tablet by mouth 2 (Two) Times a Day As Needed for Muscle Spasms.  Dispense: 30 tablet; Refill: 1    2. Hypertension, unspecified type  -     olmesartan (BENICAR) 5 MG tablet; Take 1 tablet by mouth Daily.  Dispense: 90 tablet; Refill: 3  -     chlorthalidone (HYGROTON) 25 MG tablet; Take 1 tablet by mouth Daily.  Dispense: 90 tablet; Refill: 3    3. Flu vaccine  need  -     Fluzone >6mos ([id number])    4. Screen for colon cancer  -     Ambulatory Referral For Screening Colonoscopy    I spent 15 minutes in patient care: Reviewing records prior to the visit, examining the patient, entering orders and documentation    Part of this note may be an electronic transcription/translation of spoken language to printed text using the Dragon Dictation System.        Patient or patient representative verbalized consent for the use of Ambient Listening during the visit with  ISSAC Perez for chart documentation. 11/11/2024  11:15 EST

## 2024-12-19 ENCOUNTER — TELEPHONE (OUTPATIENT)
Dept: FAMILY MEDICINE CLINIC | Facility: CLINIC | Age: 44
End: 2024-12-19
Payer: COMMERCIAL

## 2024-12-19 RX ORDER — PREDNISONE 20 MG/1
20 TABLET ORAL 2 TIMES DAILY
Qty: 10 TABLET | Refills: 0 | Status: SHIPPED | OUTPATIENT
Start: 2024-12-19

## 2024-12-19 NOTE — TELEPHONE ENCOUNTER
Patient state she no longer ha pain in her neck, but now on the right side going into here shoulder and arm. If she goes to reach for something patient states it she gets numbness down to her elbow. She has been doing the exercising Bernard recommended her doing, while doing those she will experience numbness down to her thumb.     Please advise.

## 2025-01-17 ENCOUNTER — OUTSIDE FACILITY SERVICE (OUTPATIENT)
Dept: GASTROENTEROLOGY | Facility: CLINIC | Age: 45
End: 2025-01-17
Payer: COMMERCIAL

## 2025-01-17 PROCEDURE — 45380 COLONOSCOPY AND BIOPSY: CPT | Performed by: INTERNAL MEDICINE

## 2025-01-17 PROCEDURE — 45385 COLONOSCOPY W/LESION REMOVAL: CPT | Performed by: INTERNAL MEDICINE

## 2025-01-17 PROCEDURE — 88305 TISSUE EXAM BY PATHOLOGIST: CPT | Performed by: INTERNAL MEDICINE

## 2025-01-20 ENCOUNTER — LAB REQUISITION (OUTPATIENT)
Dept: LAB | Facility: HOSPITAL | Age: 45
End: 2025-01-20
Payer: COMMERCIAL

## 2025-01-20 DIAGNOSIS — D12.3 BENIGN NEOPLASM OF TRANSVERSE COLON: ICD-10-CM

## 2025-01-20 DIAGNOSIS — K64.8 OTHER HEMORRHOIDS: ICD-10-CM

## 2025-01-20 DIAGNOSIS — Z12.11 ENCOUNTER FOR SCREENING FOR MALIGNANT NEOPLASM OF COLON: ICD-10-CM

## 2025-01-20 DIAGNOSIS — D12.0 BENIGN NEOPLASM OF CECUM: ICD-10-CM

## 2025-01-21 LAB
CYTO UR: NORMAL
LAB AP CASE REPORT: NORMAL
LAB AP CLINICAL INFORMATION: NORMAL
LAB AP DIAGNOSIS COMMENT: NORMAL
PATH REPORT.FINAL DX SPEC: NORMAL
PATH REPORT.GROSS SPEC: NORMAL